# Patient Record
Sex: FEMALE | Race: WHITE | Employment: FULL TIME | URBAN - METROPOLITAN AREA
[De-identification: names, ages, dates, MRNs, and addresses within clinical notes are randomized per-mention and may not be internally consistent; named-entity substitution may affect disease eponyms.]

---

## 2017-01-03 ENCOUNTER — OFFICE VISIT (OUTPATIENT)
Dept: FAMILY MEDICINE CLINIC | Age: 37
End: 2017-01-03

## 2017-01-03 VITALS
WEIGHT: 170 LBS | DIASTOLIC BLOOD PRESSURE: 80 MMHG | OXYGEN SATURATION: 99 % | SYSTOLIC BLOOD PRESSURE: 134 MMHG | BODY MASS INDEX: 30.12 KG/M2 | HEIGHT: 63 IN | RESPIRATION RATE: 18 BRPM | TEMPERATURE: 97.8 F | HEART RATE: 80 BPM

## 2017-01-03 DIAGNOSIS — I10 ESSENTIAL HYPERTENSION: ICD-10-CM

## 2017-01-03 DIAGNOSIS — J06.9 UPPER RESPIRATORY TRACT INFECTION, UNSPECIFIED TYPE: Primary | ICD-10-CM

## 2017-01-03 RX ORDER — AZITHROMYCIN 250 MG/1
TABLET, FILM COATED ORAL
Qty: 6 TAB | Refills: 1 | Status: SHIPPED | OUTPATIENT
Start: 2017-01-03 | End: 2017-01-08

## 2017-01-03 NOTE — PROGRESS NOTES
Chief Complaint   Patient presents with    Hypertension     follow up on BP    Cold     1. Have you been to the ER, urgent care clinic since your last visit? Hospitalized since your last visit? No    2. Have you seen or consulted any other health care providers outside of the [address] [place] [address] since your last visit? Include any pap smears or colon screening. No     Reviewed record in preparation for visit and have obtained necessary documentation.

## 2017-01-03 NOTE — MR AVS SNAPSHOT
Visit Information Date & Time Provider Department Dept. Phone Encounter #  
 1/3/2017 10:45 AM Viktoria Burns MD [facility] [phone] [id number] Follow-up Instructions Return in about 1 year (around 1/3/2018). Upcoming Health Maintenance Date Due  
 PAP AKA CERVICAL CYTOLOGY 2/21/2016 DTaP/Tdap/Td series (2 - Td) 3/17/2019 Allergies as of 1/3/2017  Review Complete On: 1/3/2017 By: Viktoria Burns MD  
  
 Severity Noted Reaction Type Reactions Latex  03/31/2011    Rash Current Immunizations  Reviewed on 12/13/2016 Name Date Influenza Vaccine 10/13/2016 Pneumococcal Polysaccharide (PPSV-23) 11/27/2013 TDAP Vaccine 3/17/2009 Not reviewed this visit You Were Diagnosed With   
  
 Codes Comments Upper respiratory tract infection, unspecified type    -  Primary ICD-10-CM: J06.9 ICD-9-CM: 465.9 Essential hypertension     ICD-10-CM: I10 
ICD-9-CM: 401.9 controlled OFF Rx, will follow Vitals BP Pulse Temp Resp Height(growth percentile) Weight(growth percentile) 134/80 (BP 1 Location: Right arm, BP Patient Position: Sitting) 80 97.8 °F (36.6 °C) (Oral) 18 5' 3\" (1.6 m) 170 lb (77.1 kg) LMP SpO2 BMI OB Status Smoking Status 12/16/2016 99% 30.11 kg/m2 Having regular periods Former Smoker Vitals History BMI and BSA Data Body Mass Index Body Surface Area  
 30.11 kg/m 2 1.85 m 2 Preferred Pharmacy Pharmacy Name Phone [id number] [address] [phone] Your Updated Medication List  
  
   
This list is accurate as of: 1/3/17 10:56 AM.  Always use your most recent med list.  
  
  
  
  
 aspirin delayed-release 81 mg tablet Take  by mouth daily. azithromycin 250 mg tablet Commonly known as:  Idelia Fines Take 2 tablets today, then take 1 tablet daily MELATIN PO Take 10 mg by mouth. multivitamin tablet Commonly known as:  ONE A DAY Take 1 tablet by mouth daily. ZYRTEC PO Take  by mouth. Prescriptions Sent to Pharmacy Refills  
 azithromycin (ZITHROMAX) 250 mg tablet 1 Sig: Take 2 tablets today, then take 1 tablet daily Class: Normal  
 Pharmacy: [id number]- [address], [address]) Ph #: [phone] Follow-up Instructions Return in about 1 year (around 1/3/2018). Patient Instructions Focus on regular exercise (150 minutes each week) and healthy eating. Eat more fruits and vegetables. Eat more protein (egg whites, beans, and nuts you know you tolerate) and less carbohydrates (white bread, white rice, white pasta, white potatoes, sodas, and sweets). Eat appropriately small portion sizes. Keep up with female exams Gargle with warm salt water Take Aleve:  2 pills twice a day with food for pain Use OTC Mucinex for cough Try Zyrtec 10 mg daily for allergy symptoms. Consider over the counter nasacort steroid nasal spray Consider trying the \"NetiPot\" which is a salt water nasal flush if you have nasal congestion:  It really helps! Make sure you use distilled water to make the saline solution Introducing [facility]! Kami Rao introduces [employer] patient portal. Now you can access parts of your medical record, email your doctor's office, and request medication refills online. 1. In your internet browser, go to [email or web address] 2. Click on the First Time User? Click Here link in the Sign In box. You will see the New Member Sign Up page. 3. Enter your [employer] Access Code exactly as it appears below. You will not need to use this code after youve completed the sign-up process. If you do not sign up before the expiration date, you must request a new code.  
 
· [employer] Access Code: [email or web address] 
 Expires: 3/13/2017  8:26 AM 
 
4. Enter the last four digits of your Social Security Number (xxxx) and Date of Birth (mm/dd/yyyy) as indicated and click Submit. You will be taken to the next sign-up page. 5. Create a [employer] ID. This will be your [employer] login ID and cannot be changed, so think of one that is secure and easy to remember. 6. Create a [employer] password. You can change your password at any time. 7. Enter your Password Reset Question and Answer. This can be used at a later time if you forget your password. 8. Enter your e-mail address. You will receive e-mail notification when new information is available in [address] 9. Click Sign Up. You can now view and download portions of your medical record. 10. Click the Download Summary menu link to download a portable copy of your medical information. If you have questions, please visit the Frequently Asked Questions section of the [employer] website. Remember, [employer] is NOT to be used for urgent needs. For medical emergencies, dial 911. Now available from your iPhone and Android! Please provide this summary of care documentation to your next provider. Your primary care clinician is listed as Joellen Clark. If you have any questions after today's visit, please call [phone].

## 2017-01-03 NOTE — PROGRESS NOTES
The majority of today's visit was counseling or coordination of care 15 minutes for the following reasons:        See diagnoses and orders, see patient instructions    Last visit:  BP check; Looks good. She may need refills but she is interested in trying a drub holiday to see if she still needs Rx     She is exercising 4 times a week. No longer smoking     FH CAD: Lost dad to MI age 61, lost mo age 76 to AS/CHF     Her GYN was done 3/2016 Dr. Manley Orn     flu vaccine 10/2016    Today:BP looks good. Will continue to focus on TLC    Visit Vitals    /80 (BP 1 Location: Right arm, BP Patient Position: Sitting)    Pulse 80    Temp 97.8 °F (36.6 °C) (Oral)    Resp 18    Ht 5' 3\" (1.6 m)    Wt 170 lb (77.1 kg)    SpO2 99%    BMI 30.11 kg/m2       Signs and symptoms:  Cough producing yellow green sputum  Duration: one week  Context:  +exposures  Location:  Head and chest  Quality:  congestion  Severity:  Preventing rest  Timing:  now  Modifying factors:  No fevers    ROS:  The patient denies:  Headches/Chest Pain/SOB/fevers  Any positive ROS include: sinus congestion    Patient's last menstrual period was 12/16/2016.     Physical Exam  Visit Vitals    /80 (BP 1 Location: Right arm, BP Patient Position: Sitting)    Pulse 80    Temp 97.8 °F (36.6 °C) (Oral)    Resp 18    Ht 5' 3\" (1.6 m)    Wt 170 lb (77.1 kg)    LMP 12/16/2016    SpO2 99%    BMI 30.11 kg/m2     BP Readings from Last 3 Encounters:   01/03/17 134/80   12/13/16 121/77   01/19/15 122/79       Constitutional:  Appears well,  No Acute Distress, Vitals noted  Psychiatric:   Affect normal, Alert and Oriented to person/place/time    Eyes:   Pupils equally round and reactive, EOMI, conjunctiva clear, eyelids normal  ENT:   External ears and nose normal/lips, teeth=OK/gums normal, TMs and Oropharynx normal  Neck:   general inspection and Thyroid normal.  No abnormal cervical or supraclavicular nodes    Lungs:   clear to auscultation, good respiratory effort  Heart: Auscultation normal.  Regular rhythm. No cardiac murmurs. No carotid bruits or palpable thrills  Chest wall normal      ICD-10-CM ICD-9-CM    1. Upper respiratory tract infection, unspecified type J06.9 465.9 azithromycin (ZITHROMAX) 250 mg tablet   2.  Essential hypertension I10 401.9     controlled OFF Rx, will follow     See instructions

## 2017-01-03 NOTE — PATIENT INSTRUCTIONS
Focus on regular exercise (150 minutes each week) and healthy eating. Eat more fruits and vegetables. Eat more protein (egg whites, beans, and nuts you know you tolerate) and less carbohydrates (white bread, white rice, white pasta, white potatoes, sodas, and sweets). Eat appropriately small portion sizes. Keep up with female exams    Gargle with warm salt water    Take Aleve:  2 pills twice a day with food for pain    Use OTC Mucinex for cough    Try Zyrtec 10 mg daily for allergy symptoms. Consider over the counter nasacort steroid nasal spray    Consider trying the \"NetiPot\" which is a salt water nasal flush if you have nasal congestion:  It really helps!   Make sure you use distilled water to make the saline solution

## 2017-01-06 ENCOUNTER — TELEPHONE (OUTPATIENT)
Dept: FAMILY MEDICINE CLINIC | Age: 37
End: 2017-01-06

## 2017-01-06 NOTE — TELEPHONE ENCOUNTER
----- Message from Donell Lara sent at 1/6/2017 11:48 AM EST -----  Regarding: Dr Chel Eric  Contact: [phone]  Pt is calling to get a medication sent in to the pharmacy on file for a yeast infection